# Patient Record
Sex: FEMALE | URBAN - METROPOLITAN AREA
[De-identification: names, ages, dates, MRNs, and addresses within clinical notes are randomized per-mention and may not be internally consistent; named-entity substitution may affect disease eponyms.]

---

## 2018-09-17 ENCOUNTER — NEW REFERRAL (OUTPATIENT)
Dept: URBAN - METROPOLITAN AREA CLINIC 105 | Facility: CLINIC | Age: 68
End: 2018-09-17

## 2018-09-17 DIAGNOSIS — H40.1112: ICD-10-CM

## 2018-09-17 DIAGNOSIS — H43.813: ICD-10-CM

## 2018-09-17 DIAGNOSIS — H43.392: ICD-10-CM

## 2018-09-17 DIAGNOSIS — H40.1123: ICD-10-CM

## 2018-09-17 PROCEDURE — G9903 PT SCRN TBCO ID AS NON USER: HCPCS

## 2018-09-17 PROCEDURE — 1036F TOBACCO NON-USER: CPT

## 2018-09-17 PROCEDURE — 92225 OPHTHALMOSCOPY (INITIAL): CPT

## 2018-09-17 PROCEDURE — 0517F GLAUCOMA PLAN OF CARE DOCD: CPT

## 2018-09-17 PROCEDURE — 2027F OPTIC NERVE HEAD EVAL DONE: CPT

## 2018-09-17 PROCEDURE — 4040F PNEUMOC VAC/ADMIN/RCVD: CPT | Mod: 8P

## 2018-09-17 PROCEDURE — G8482 FLU IMMUNIZE ORDER/ADMIN: HCPCS

## 2018-09-17 PROCEDURE — 99203 OFFICE O/P NEW LOW 30 MIN: CPT

## 2018-09-17 ASSESSMENT — VISUAL ACUITY
OS_CC: 20/20
OS_CC: 20/25+2
OD_CC: 20/20
OD_CC: 20/30+2

## 2018-09-17 ASSESSMENT — TONOMETRY
OD_IOP_MMHG: 18
OS_IOP_MMHG: 15

## 2018-10-01 ENCOUNTER — BRIEF FOLLOW-UP (OUTPATIENT)
Dept: URBAN - METROPOLITAN AREA CLINIC 105 | Facility: CLINIC | Age: 68
End: 2018-10-01

## 2018-10-01 DIAGNOSIS — H43.393: ICD-10-CM

## 2018-10-01 DIAGNOSIS — H40.1112: ICD-10-CM

## 2018-10-01 DIAGNOSIS — H43.813: ICD-10-CM

## 2018-10-01 DIAGNOSIS — H53.8: ICD-10-CM

## 2018-10-01 DIAGNOSIS — H40.1123: ICD-10-CM

## 2018-10-01 PROCEDURE — G8482 FLU IMMUNIZE ORDER/ADMIN: HCPCS

## 2018-10-01 PROCEDURE — 92226 OPHTHALMOSCOPY (SUB): CPT

## 2018-10-01 PROCEDURE — 92134 CPTRZ OPH DX IMG PST SGM RTA: CPT

## 2018-10-01 PROCEDURE — 2027F OPTIC NERVE HEAD EVAL DONE: CPT

## 2018-10-01 PROCEDURE — 92012 INTRM OPH EXAM EST PATIENT: CPT

## 2018-10-01 PROCEDURE — 4040F PNEUMOC VAC/ADMIN/RCVD: CPT | Mod: 8P

## 2018-10-01 PROCEDURE — 0517F GLAUCOMA PLAN OF CARE DOCD: CPT

## 2018-10-01 PROCEDURE — G9903 PT SCRN TBCO ID AS NON USER: HCPCS

## 2018-10-01 PROCEDURE — 1036F TOBACCO NON-USER: CPT

## 2018-10-01 ASSESSMENT — VISUAL ACUITY
OS_CC: 20/25-1
OD_CC: 20/25-1

## 2018-10-01 ASSESSMENT — TONOMETRY
OD_IOP_MMHG: 20
OS_IOP_MMHG: 20

## 2018-10-29 ENCOUNTER — BRIEF FOLLOW-UP (OUTPATIENT)
Dept: URBAN - METROPOLITAN AREA CLINIC 105 | Facility: CLINIC | Age: 68
End: 2018-10-29

## 2018-10-29 DIAGNOSIS — H43.311: ICD-10-CM

## 2018-10-29 DIAGNOSIS — H43.393: ICD-10-CM

## 2018-10-29 DIAGNOSIS — H43.813: ICD-10-CM

## 2018-10-29 DIAGNOSIS — H40.1112: ICD-10-CM

## 2018-10-29 DIAGNOSIS — H40.1123: ICD-10-CM

## 2018-10-29 DIAGNOSIS — H53.19: ICD-10-CM

## 2018-10-29 PROCEDURE — G8427 DOCREV CUR MEDS BY ELIG CLIN: HCPCS

## 2018-10-29 PROCEDURE — G9903 PT SCRN TBCO ID AS NON USER: HCPCS

## 2018-10-29 PROCEDURE — G8482 FLU IMMUNIZE ORDER/ADMIN: HCPCS

## 2018-10-29 PROCEDURE — 92012 INTRM OPH EXAM EST PATIENT: CPT

## 2018-10-29 PROCEDURE — 92226 OPHTHALMOSCOPY (SUB): CPT

## 2018-10-29 PROCEDURE — 4040F PNEUMOC VAC/ADMIN/RCVD: CPT | Mod: 8P

## 2018-10-29 PROCEDURE — 1036F TOBACCO NON-USER: CPT

## 2018-10-29 ASSESSMENT — VISUAL ACUITY
OD_CC: 20/30+2
OS_CC: 20/25-2

## 2018-10-29 ASSESSMENT — TONOMETRY
OS_IOP_MMHG: 18
OD_IOP_MMHG: 18

## 2021-01-29 ENCOUNTER — PREPPED CHART (OUTPATIENT)
Dept: URBAN - METROPOLITAN AREA CLINIC 102 | Facility: CLINIC | Age: 71
End: 2021-01-29

## 2021-01-29 PROBLEM — H04.123 DRY EYE SYNDROME: Noted: 2021-01-29

## 2021-01-29 PROBLEM — H40.1132 POAG, MODERATE: Noted: 2021-01-29

## 2021-01-29 PROBLEM — H16.223 KERATOCONJUNCTIVITIS SICCA: Noted: 2021-01-29

## 2021-12-14 ENCOUNTER — FOLLOW UP (OUTPATIENT)
Dept: URBAN - METROPOLITAN AREA CLINIC 102 | Facility: CLINIC | Age: 71
End: 2021-12-14

## 2021-12-14 DIAGNOSIS — H40.1132: ICD-10-CM

## 2021-12-14 DIAGNOSIS — H04.123: ICD-10-CM

## 2021-12-14 PROCEDURE — 99214 OFFICE O/P EST MOD 30 MIN: CPT

## 2021-12-14 ASSESSMENT — TONOMETRY
OD_IOP_MMHG: 11
OD_IOP_MMHG: 13
OS_IOP_MMHG: 15
OS_IOP_MMHG: 12

## 2021-12-14 ASSESSMENT — VISUAL ACUITY
OS_CC: 20/25-2
OD_CC: 20/25-1
OU_CC: J1

## 2022-03-29 ENCOUNTER — FOLLOW UP (OUTPATIENT)
Dept: URBAN - METROPOLITAN AREA CLINIC 102 | Facility: CLINIC | Age: 72
End: 2022-03-29

## 2022-03-29 DIAGNOSIS — H04.123: ICD-10-CM

## 2022-03-29 DIAGNOSIS — H40.1132: ICD-10-CM

## 2022-03-29 PROCEDURE — 99214 OFFICE O/P EST MOD 30 MIN: CPT

## 2022-03-29 ASSESSMENT — VISUAL ACUITY
OD_CC: 20/30+1
OU_CC: 20/25-1
OU_CC: J1
OS_CC: 20/40-1

## 2022-03-29 ASSESSMENT — TONOMETRY
OS_IOP_MMHG: 17
OD_IOP_MMHG: 14
OD_IOP_MMHG: 15
OS_IOP_MMHG: 15

## 2022-05-05 ENCOUNTER — EMERGENCY VISIT (OUTPATIENT)
Dept: URBAN - METROPOLITAN AREA CLINIC 102 | Facility: CLINIC | Age: 72
End: 2022-05-05

## 2022-05-05 DIAGNOSIS — H10.213: ICD-10-CM

## 2022-05-05 DIAGNOSIS — H04.123: ICD-10-CM

## 2022-05-05 PROCEDURE — 99214 OFFICE O/P EST MOD 30 MIN: CPT

## 2022-05-05 ASSESSMENT — VISUAL ACUITY
OD_CC: 20/30
OS_CC: 20/40

## 2022-05-05 ASSESSMENT — TONOMETRY
OS_IOP_MMHG: 15
OD_IOP_MMHG: 16

## 2022-09-27 ENCOUNTER — FOLLOW UP (OUTPATIENT)
Dept: URBAN - METROPOLITAN AREA CLINIC 102 | Facility: CLINIC | Age: 72
End: 2022-09-27

## 2022-09-27 DIAGNOSIS — H04.123: ICD-10-CM

## 2022-09-27 DIAGNOSIS — H40.1132: ICD-10-CM

## 2022-09-27 PROCEDURE — 99214 OFFICE O/P EST MOD 30 MIN: CPT

## 2022-09-27 PROCEDURE — 92020 GONIOSCOPY: CPT

## 2022-09-27 ASSESSMENT — TONOMETRY
OS_IOP_MMHG: 15
OD_IOP_MMHG: 14
OS_IOP_MMHG: 14
OD_IOP_MMHG: 14

## 2022-09-27 ASSESSMENT — VISUAL ACUITY
OD_CC: 20/30
OU_CC: J3
OS_CC: 20/30

## 2022-10-25 ENCOUNTER — ESTABLISHED (OUTPATIENT)
Dept: URBAN - METROPOLITAN AREA CLINIC 102 | Facility: CLINIC | Age: 72
End: 2022-10-25

## 2022-10-25 DIAGNOSIS — H40.1132: ICD-10-CM

## 2022-10-25 DIAGNOSIS — H04.123: ICD-10-CM

## 2022-10-25 PROCEDURE — 99214 OFFICE O/P EST MOD 30 MIN: CPT

## 2022-10-25 ASSESSMENT — VISUAL ACUITY
OD_CC: 20/50-1
OS_CC: 20/40-1
OD_PH: 20/30-1

## 2022-10-25 ASSESSMENT — TONOMETRY
OD_IOP_MMHG: 12
OS_IOP_MMHG: 15

## 2022-11-22 ENCOUNTER — FOLLOW UP (OUTPATIENT)
Dept: URBAN - METROPOLITAN AREA CLINIC 102 | Facility: CLINIC | Age: 72
End: 2022-11-22

## 2022-11-22 DIAGNOSIS — H04.123: ICD-10-CM

## 2022-11-22 DIAGNOSIS — H40.1132: ICD-10-CM

## 2022-11-22 PROCEDURE — 92250 FUNDUS PHOTOGRAPHY W/I&R: CPT

## 2022-11-22 PROCEDURE — 99214 OFFICE O/P EST MOD 30 MIN: CPT

## 2022-11-22 PROCEDURE — 92083 EXTENDED VISUAL FIELD XM: CPT

## 2022-11-22 ASSESSMENT — VISUAL ACUITY
OU_CC: 20/25-2
OS_CC: 20/30
OD_CC: 20/30-1

## 2022-11-22 ASSESSMENT — TONOMETRY
OS_IOP_MMHG: 23
OD_IOP_MMHG: 21

## 2022-12-12 ENCOUNTER — PROCEDURE ONLY (OUTPATIENT)
Dept: URBAN - METROPOLITAN AREA CLINIC 40 | Facility: CLINIC | Age: 72
End: 2022-12-12

## 2022-12-12 DIAGNOSIS — H40.1132: ICD-10-CM

## 2022-12-12 PROCEDURE — 65855 TRABECULOPLASTY LASER SURG: CPT

## 2022-12-12 ASSESSMENT — VISUAL ACUITY
OS_CC: 20/40
OD_CC: 20/25

## 2022-12-12 ASSESSMENT — TONOMETRY
OD_IOP_MMHG: 17
OS_IOP_MMHG: 17

## 2023-01-24 ENCOUNTER — COMPLETE EYE EXAM (OUTPATIENT)
Dept: URBAN - METROPOLITAN AREA CLINIC 102 | Facility: CLINIC | Age: 73
End: 2023-01-24

## 2023-01-24 DIAGNOSIS — Z01.00: ICD-10-CM

## 2023-01-24 DIAGNOSIS — H52.203: ICD-10-CM

## 2023-01-24 PROCEDURE — 92012 INTRM OPH EXAM EST PATIENT: CPT

## 2023-01-24 PROCEDURE — 92015 DETERMINE REFRACTIVE STATE: CPT

## 2023-01-24 ASSESSMENT — TONOMETRY
OD_IOP_MMHG: 20
OS_IOP_MMHG: 17

## 2023-01-24 ASSESSMENT — VISUAL ACUITY
OU_CC: J2
OS_CC: 20/40
OD_CC: 20/40-1

## 2023-04-17 ENCOUNTER — LASER ONLY (OUTPATIENT)
Dept: URBAN - METROPOLITAN AREA CLINIC 40 | Facility: CLINIC | Age: 73
End: 2023-04-17

## 2023-04-17 DIAGNOSIS — H40.1132: ICD-10-CM

## 2023-04-17 PROCEDURE — 65855 TRABECULOPLASTY LASER SURG: CPT

## 2023-04-17 ASSESSMENT — VISUAL ACUITY
OD_CC: 20/25
OS_CC: 20/30

## 2023-04-17 ASSESSMENT — TONOMETRY
OD_IOP_MMHG: 17
OS_IOP_MMHG: 14
OS_IOP_MMHG: 16

## 2023-05-12 ENCOUNTER — FOLLOW UP (OUTPATIENT)
Dept: URBAN - METROPOLITAN AREA CLINIC 102 | Facility: CLINIC | Age: 73
End: 2023-05-12

## 2023-05-12 DIAGNOSIS — H40.1132: ICD-10-CM

## 2023-05-12 DIAGNOSIS — H04.123: ICD-10-CM

## 2023-05-12 PROCEDURE — 99214 OFFICE O/P EST MOD 30 MIN: CPT

## 2023-05-12 ASSESSMENT — VISUAL ACUITY
OD_CC: 20/25-1
OS_CC: 20/30
OU_CC: 20/20

## 2023-05-12 ASSESSMENT — TONOMETRY
OS_IOP_MMHG: 21
OD_IOP_MMHG: 21

## 2023-06-30 ENCOUNTER — PROBLEM (OUTPATIENT)
Dept: URBAN - METROPOLITAN AREA CLINIC 102 | Facility: CLINIC | Age: 73
End: 2023-06-30

## 2023-06-30 DIAGNOSIS — H40.1132: ICD-10-CM

## 2023-06-30 DIAGNOSIS — H04.123: ICD-10-CM

## 2023-06-30 DIAGNOSIS — S05.02XA: ICD-10-CM

## 2023-06-30 PROCEDURE — 99214 OFFICE O/P EST MOD 30 MIN: CPT

## 2023-06-30 ASSESSMENT — VISUAL ACUITY
OD_CC: 20/70
OU_CC: 20/30
OD_PH: 20/60
OS_CC: 20/60-2

## 2023-06-30 ASSESSMENT — TONOMETRY
OD_IOP_MMHG: 17
OS_IOP_MMHG: 21

## 2023-07-03 ENCOUNTER — APPOINTMENT (OUTPATIENT)
Dept: UROLOGY | Facility: CLINIC | Age: 73
End: 2023-07-03
Payer: MEDICARE

## 2023-07-03 VITALS
SYSTOLIC BLOOD PRESSURE: 125 MMHG | HEIGHT: 59 IN | WEIGHT: 155 LBS | TEMPERATURE: 98.1 F | DIASTOLIC BLOOD PRESSURE: 75 MMHG | OXYGEN SATURATION: 99 % | HEART RATE: 75 BPM | BODY MASS INDEX: 31.25 KG/M2

## 2023-07-03 DIAGNOSIS — Z00.00 ENCOUNTER FOR GENERAL ADULT MEDICAL EXAMINATION W/OUT ABNORMAL FINDINGS: ICD-10-CM

## 2023-07-03 PROCEDURE — 99204 OFFICE O/P NEW MOD 45 MIN: CPT

## 2023-07-05 LAB
BILIRUB UR QL STRIP: NORMAL
CLARITY UR: CLEAR
COLLECTION METHOD: NORMAL
GLUCOSE UR-MCNC: NORMAL
HCG UR QL: 0.2 EU/DL
HGB UR QL STRIP.AUTO: NORMAL
KETONES UR-MCNC: NORMAL
LEUKOCYTE ESTERASE UR QL STRIP: NORMAL
NITRITE UR QL STRIP: NORMAL
PH UR STRIP: 6.5
PROT UR STRIP-MCNC: NORMAL
SP GR UR STRIP: 1.01

## 2023-07-07 ENCOUNTER — FOLLOW UP (OUTPATIENT)
Dept: URBAN - METROPOLITAN AREA CLINIC 102 | Facility: CLINIC | Age: 73
End: 2023-07-07

## 2023-07-07 DIAGNOSIS — S05.02XD: ICD-10-CM

## 2023-07-07 DIAGNOSIS — H40.1132: ICD-10-CM

## 2023-07-07 DIAGNOSIS — H04.123: ICD-10-CM

## 2023-07-07 PROCEDURE — 99214 OFFICE O/P EST MOD 30 MIN: CPT

## 2023-07-07 ASSESSMENT — VISUAL ACUITY
OS_CC: 20/30-2
OU_CC: J2
OD_CC: 20/40

## 2023-07-07 ASSESSMENT — TONOMETRY
OS_IOP_MMHG: 14
OD_IOP_MMHG: 12
OD_IOP_MMHG: 12
OS_IOP_MMHG: 14

## 2023-07-21 ENCOUNTER — FOLLOW UP (OUTPATIENT)
Dept: URBAN - METROPOLITAN AREA CLINIC 102 | Facility: CLINIC | Age: 73
End: 2023-07-21

## 2023-07-21 DIAGNOSIS — H04.123: ICD-10-CM

## 2023-07-21 DIAGNOSIS — H40.1132: ICD-10-CM

## 2023-07-21 PROCEDURE — 92133 CPTRZD OPH DX IMG PST SGM ON: CPT

## 2023-07-21 PROCEDURE — 99214 OFFICE O/P EST MOD 30 MIN: CPT

## 2023-07-21 ASSESSMENT — TONOMETRY
OD_IOP_MMHG: 17
OS_IOP_MMHG: 17

## 2023-07-21 ASSESSMENT — VISUAL ACUITY
OD_CC: 20/30-2
OS_CC: 20/40-1
OU_CC: 20/20

## 2023-08-10 ENCOUNTER — APPOINTMENT (OUTPATIENT)
Dept: UROLOGY | Facility: CLINIC | Age: 73
End: 2023-08-10

## 2024-01-11 NOTE — ADDENDUM
[FreeTextEntry1] : 4/25/2023: CTwoIV C Left sided stones with multiple CA++ around the course of the left ureter: phleboliths vs stones No hydro Non/obstructing left renal stones.

## 2024-02-12 ENCOUNTER — EMERGENCY VISIT (OUTPATIENT)
Dept: URBAN - METROPOLITAN AREA CLINIC 102 | Facility: CLINIC | Age: 74
End: 2024-02-12

## 2024-02-12 DIAGNOSIS — H04.123: ICD-10-CM

## 2024-02-12 PROCEDURE — 92014 COMPRE OPH EXAM EST PT 1/>: CPT

## 2024-02-12 ASSESSMENT — VISUAL ACUITY
OD_CC: 20/30
OU_CC: J1+
OS_CC: 20/40

## 2024-02-12 ASSESSMENT — TONOMETRY
OD_IOP_MMHG: 15
OS_IOP_MMHG: 13

## 2024-03-26 ENCOUNTER — FOLLOW UP (OUTPATIENT)
Dept: URBAN - METROPOLITAN AREA CLINIC 102 | Facility: CLINIC | Age: 74
End: 2024-03-26

## 2024-03-26 DIAGNOSIS — H40.1132: ICD-10-CM

## 2024-03-26 DIAGNOSIS — H04.123: ICD-10-CM

## 2024-03-26 PROCEDURE — 92014 COMPRE OPH EXAM EST PT 1/>: CPT

## 2024-03-26 PROCEDURE — 92250 FUNDUS PHOTOGRAPHY W/I&R: CPT

## 2024-03-26 ASSESSMENT — TONOMETRY
OD_IOP_MMHG: 18
OS_IOP_MMHG: 18

## 2024-03-26 ASSESSMENT — VISUAL ACUITY
OD_CC: 20/25
OS_CC: 20/40-1
OU_CC: 20/25

## 2024-08-20 ENCOUNTER — FOLLOW UP (OUTPATIENT)
Dept: URBAN - METROPOLITAN AREA CLINIC 102 | Facility: CLINIC | Age: 74
End: 2024-08-20

## 2024-08-20 DIAGNOSIS — H40.1132: ICD-10-CM

## 2024-08-20 DIAGNOSIS — H04.123: ICD-10-CM

## 2024-08-20 PROCEDURE — 92083 EXTENDED VISUAL FIELD XM: CPT

## 2024-08-20 PROCEDURE — 99214 OFFICE O/P EST MOD 30 MIN: CPT

## 2024-08-20 ASSESSMENT — VISUAL ACUITY
OD_CC: 20/30+2
OS_CC: 20/40-1
OU_CC: 20/20-1

## 2024-08-20 ASSESSMENT — TONOMETRY
OS_IOP_MMHG: 18
OD_IOP_MMHG: 22

## 2025-02-13 ENCOUNTER — PREPPED CHART (OUTPATIENT)
Dept: URBAN - METROPOLITAN AREA CLINIC 102 | Facility: CLINIC | Age: 75
End: 2025-02-13

## (undated) RX ORDER — LIFITEGRAST 50 MG/ML: 1 SOLUTION/ DROPS OPHTHALMIC TWICE A DAY

## (undated) RX ORDER — LATANOPROST 50 UG/ML: 1 SOLUTION/ DROPS OPHTHALMIC

## (undated) RX ORDER — BRIMONIDINE TARTRATE, TIMOLOL MALEATE 2; 5 MG/ML; MG/ML: 1 SOLUTION/ DROPS OPHTHALMIC TWICE A DAY

## (undated) RX ORDER — BRIMONIDINE TARTRATE 1 MG/ML: 1 SOLUTION/ DROPS OPHTHALMIC TWICE A DAY

## (undated) RX ORDER — DORZOLAMIDE HYDROCHLORIDE TIMOLOL MALEATE 20; 5 MG/ML; MG/ML: 1 SOLUTION/ DROPS OPHTHALMIC TWICE A DAY